# Patient Record
(demographics unavailable — no encounter records)

---

## 2025-03-03 NOTE — REASON FOR VISIT
[CV Risk Factors and Non-Cardiac Disease] : CV risk factors and non-cardiac disease [Hyperlipidemia] : hyperlipidemia [Hypertension] : hypertension [Source: ______] : History obtained from [unfilled] [Language [employer] ] : provided by [employer]   [FreeTextEntry1] : MA translated.

## 2025-03-03 NOTE — ASSESSMENT
[FreeTextEntry1] : 66 y/o female with history of HTN, DL, DM, CAD  Palpitations - resolved  Negative stress test and unremarkable echo - normal LV function, no evidence for ischemia Non-obstructive CAD  ASA 81 mg DM control. HgA1c - 7.0 - up from 6.3% - repeat HgA1c to re-assess glycemic control. diet discussed. F/u with PMD. Lipid control, c/w statin, labs noted - repeat lipid panel noted. Can decrease 1/2 pill  All questions answered. F/u with PMD as scheduled. F/u in 6 months

## 2025-04-10 NOTE — DISCUSSION/SUMMARY
[FreeTextEntry1] : Presenting with neck fatigue without headaches. Patient has concerns for stroke given her mother passed away from stroke as well as her risk actors to include DM, HLD and HTN.   Plan: -MRI head -MRA head and neck -Follow up after testing  All questions and concerns were addressed to the best of my ability. Emotional support was rendered.

## 2025-04-10 NOTE — HISTORY OF PRESENT ILLNESS
[FreeTextEntry1] : Ms. HATFIELD presents today for a consultation. She was scheduled as an NPA for headaches however presents for symptoms of neck fatigue. She is a 66 yo woman with a PMH of Dm, HTN, HLD and Cataracts. She reports symptoms of neck fatigue without pain or weakness. Symptoms improve with light exercise and worsen with physically over exerting herself. She further reports occasional blurry vision and lightheadedness.   She denies any headaches, vision changes, double vision, vertigo, lightheadedness, dizziness, numbness, tingling, muscle or joint aches or changes in balance.

## 2025-04-10 NOTE — PHYSICAL EXAM
[FreeTextEntry1] : General inspection: Well nourished, well developed in no acute distress with stable vital signs.  Neurological examination: MS: awake, alert with fluent speech.    Cranial nerve exam: PERRLA, EOMI, no nystagmus, visual fields are full, facial movements and sensations are normal, tongue movements are normal, and uvula and soft palate rise symmetrically at midline.   Motor exam: 5/5 throughout with normal tone and bulk. Fine motor skills are intact bilaterally   Coordination: No dysmetria on F-N or H-S.    Muscle stretch reflexes: 2/5 throughout. Plantar responses are flexor bilaterally.   Sensation: intact    Gait: normal. Romberg's sign is absent.     [Over the Past 2 Weeks, Have You Felt Down, Depressed, or Hopeless?] : 1.) Over the past 2 weeks, have you felt down, depressed, or hopeless? No [Over the Past 2 Weeks, Have You Felt Little Interest or Pleasure Doing Things?] : 2.) Over the past 2 weeks, have you felt little interest or pleasure doing things? No

## 2025-07-02 NOTE — ASSESSMENT
[FreeTextEntry1] : Ms. Caceres is a 67-year-old Japanese-speaking woman with PMH of DM, HTN, HLD, and Cataracts, with incidental 4 mm proximal L cavernous and 2 mm L supraclinoid ICA aneurysms seen on outpatient imaging during neck pain workup.  Given age, size, and multiple aneurysms, we discussed the utility of a DSA to further assess these lesions. After discussing the risks and benefits of DSA, patient expressed understanding and agreement with the plan, and asked us to schedule accordingly.   Plan - Will tentatively schedule DSA for early-mid July - PAST - NPO past midnight on day of procedure - Continue all medications day of procedure as prescribed - RTC in 1-2 weeks S/P DSA to discuss findings - Patient advised to call back or RTO if any concerning symptoms.

## 2025-07-02 NOTE — ASSESSMENT
[FreeTextEntry1] : Ms. Caceres is a 67-year-old Khmer-speaking woman with PMH of DM, HTN, HLD, and Cataracts, with incidental 4 mm proximal L cavernous and 2 mm L supraclinoid ICA aneurysms seen on outpatient imaging during neck pain workup.  Given age, size, and multiple aneurysms, we discussed the utility of a DSA to further assess these lesions. After discussing the risks and benefits of DSA, patient expressed understanding and agreement with the plan, and asked us to schedule accordingly.   Plan - Will tentatively schedule DSA for early-mid July - PAST - NPO past midnight on day of procedure - Continue all medications day of procedure as prescribed - RTC in 1-2 weeks S/P DSA to discuss findings - Patient advised to call back or RTO if any concerning symptoms.

## 2025-07-02 NOTE — REASON FOR VISIT
[Consultation] : a consultation visit [Interpreters_IDNumber] : [id number] [Interpreters_FullName] : Jerson [[email or web address]] : [place]

## 2025-07-02 NOTE — DATA REVIEWED
[de-identified] : 05/15/25 MR BRAIN: Mild chronic microvascular ischemic changes. Otherwise unremarkable MRI of the brain.  MRA HEAD: 4 mm aneurysm in the proximal cavernous segment and 2 mm aneurysm in the supraclinoid segment of the left internal carotid artery.

## 2025-07-02 NOTE — REASON FOR VISIT
[Consultation] : a consultation visit [Interpreters_IDNumber] : [id number] [Interpreters_FullName] : Jerson [[email or web address]] : Citizen of [place]

## 2025-07-02 NOTE — HISTORY OF PRESENT ILLNESS
[FreeTextEntry1] : Ms. Caceres is a 67-year-old Thai-speaking woman with PMH of DM, HTN, HLD, and Cataracts, who was referred by VEL Boothe, for evaluation of incidental 4 mm proximal L cavernous and 2 mm L supraclinoid ICA aneurysms seen on neck pain workup.   States she initially presented for evaluation as she was fatigued, forgetting things, and had pain in the front of her head. Endorsed dizziness and blurred vision at the time but attributed it to the sun. Denies any known family hx of aneurysms or sudden unexplained death. Reports mother had a stroke at 82, unsure of ischemic or hemorrhagic. Never smoker. No current complaints.

## 2025-07-02 NOTE — PHYSICAL EXAM
[FreeTextEntry1] : AAOx3, NAD CN II-XII normal No aphasia or dysarthria Strength 5/5 throughout Normal sensation Normal bulk, tone Narrow based gait

## 2025-07-02 NOTE — DATA REVIEWED
[de-identified] : 05/15/25 MR BRAIN: Mild chronic microvascular ischemic changes. Otherwise unremarkable MRI of the brain.  MRA HEAD: 4 mm aneurysm in the proximal cavernous segment and 2 mm aneurysm in the supraclinoid segment of the left internal carotid artery.

## 2025-07-24 NOTE — REASON FOR VISIT
[Follow-Up: _____] : a [unfilled] follow-up visit [Language Line ] : provided by [employer]   [Time Spent: ____ minutes] : Total time spent using  services: [unfilled] minutes. The patient's primary language is not English thus required  services. [Interpreters_IDNumber] : [id number] [Interpreters_FullName] : Ratna [[email or web address]] : [place]

## 2025-07-24 NOTE — ASSESSMENT
[FreeTextEntry1] : Ms. Caceres is a 68-year-old Kazakh-speaking woman with PMH of DM, HTN, HLD, and Cataracts, with incidental 4 mm proximal L cavernous and 2 mm L supraclinoid ICA aneurysms seen on outpatient imaging during neck pain workup. s/p DSA on 07/10/25 which confirmed a 4.0 mm x 2.6 mm inferomedially projecting L munira-cavernous ICA aneurysm, infundibuli of the L PComm and achoroidal artery, and a slightly bulbous appearance of the origin of the L ophthalmic artery.  Subjective R index finger coolness may be 2/2 trauma/nerve compression from catheterization. R extremity without any overt concerning symptoms. Advised to monitor for acute worsening and notify office if there is no improvement after 2 weeks. Given location of aneurysm and lack of concerning findings, advised that it is reasonable to monitor with interval surveillance imaging for now. We discussed should there be any interval changes of her aneurysm on follow-up imaging or she develops any new or concerning symptoms, we will re-discuss additional measures at that time.    Plan - Advised to monitor RRA site for acute worsening and notify office of any questions or concerns  - Will plan to obtain interval CTA H in 6 mos. to assess aneurysm stability - Cont. avoiding any activities that would increase intracranial pressure (i.e heavy weightlifting, overexertion, and bearing-down) - RTC in 6 mos s/p CTA H to discuss results

## 2025-07-24 NOTE — REASON FOR VISIT
[Follow-Up: _____] : a [unfilled] follow-up visit [Language Line ] : provided by [employer]   [Time Spent: ____ minutes] : Total time spent using  services: [unfilled] minutes. The patient's primary language is not English thus required  services. [Interpreters_IDNumber] : [id number] [Interpreters_FullName] : Ratna [[email or web address]] : Pakistani

## 2025-07-24 NOTE — DATA REVIEWED
[de-identified] : 07/10/25 DSA: 1) There is an inferomedially projecting left munira-cavernous ICA aneurysm, measuring approximately 4.0 mm x 2.6 mm in size, that is visualized.  2) There is brisk filling of the left ophthalmic artery with a slightly bulbous origin.  3) Infundibuli of the left PComm and Achoroidal artery are noted.

## 2025-07-24 NOTE — HISTORY OF PRESENT ILLNESS
[FreeTextEntry1] : Interval History:  Ms. Caceres is a 67-year-old Divehi-speaking woman who presents s/p elective DSA on 07/10/25 which confirmed a 4.0 mm x 2.6 mm inferomedially projecting L munira-cavernous ICA aneurysm, infundibuli of the L PComm and achoroidal artery, and a slightly bulbous appearance of the origin of the L ophthalmic artery.   Since procedure, endorses a cool sensation in her R index finger. Denies any numbness or tingling. Further endorses she is anxious over results of angiogram. Denies any further complaints.   Prior History:  HPI 06/2025: Ms. Caceres is a 67-year-old Divehi-speaking woman with PMH of DM, HTN, HLD, and Cataracts, who was referred by VEL Boothe, for evaluation of incidental 4 mm proximal L cavernous and 2 mm L supraclinoid ICA aneurysms seen on neck pain workup.   States she initially presented for evaluation as she was fatigued, forgetting things, and had pain in the front of her head. Endorsed dizziness and blurred vision at the time but attributed it to the sun. Denies any family hx of aneurysms or sudden unexplained death. Reports mother had a stroke at 82, unsure of ischemic or hemorrhagic. Never smoker.

## 2025-07-24 NOTE — PHYSICAL EXAM
[FreeTextEntry1] : AAOx3, NAD CN II-XII normal No aphasia or dysarthria Strength 5/5 throughout Normal sensation Normal bulk, tone Narrow based gait   RRA catheterization site dry, clean, and intact Radial pulse palpable, extremity warm, cap refill <3s

## 2025-07-24 NOTE — DATA REVIEWED
[de-identified] : 07/10/25 DSA: 1) There is an inferomedially projecting left munira-cavernous ICA aneurysm, measuring approximately 4.0 mm x 2.6 mm in size, that is visualized.  2) There is brisk filling of the left ophthalmic artery with a slightly bulbous origin.  3) Infundibuli of the left PComm and Achoroidal artery are noted.

## 2025-07-24 NOTE — ASSESSMENT
[FreeTextEntry1] : Ms. Caceres is a 68-year-old Romansh-speaking woman with PMH of DM, HTN, HLD, and Cataracts, with incidental 4 mm proximal L cavernous and 2 mm L supraclinoid ICA aneurysms seen on outpatient imaging during neck pain workup. s/p DSA on 07/10/25 which confirmed a 4.0 mm x 2.6 mm inferomedially projecting L munira-cavernous ICA aneurysm, infundibuli of the L PComm and achoroidal artery, and a slightly bulbous appearance of the origin of the L ophthalmic artery.  Subjective R index finger coolness may be 2/2 trauma/nerve compression from catheterization. R extremity without any overt concerning symptoms. Advised to monitor for acute worsening and notify office if there is no improvement after 2 weeks. Given location of aneurysm and lack of concerning findings, advised that it is reasonable to monitor with interval surveillance imaging for now. We discussed should there be any interval changes of her aneurysm on follow-up imaging or she develops any new or concerning symptoms, we will re-discuss additional measures at that time.    Plan - Advised to monitor RRA site for acute worsening and notify office of any questions or concerns  - Will plan to obtain interval CTA H in 6 mos. to assess aneurysm stability - Cont. avoiding any activities that would increase intracranial pressure (i.e heavy weightlifting, overexertion, and bearing-down) - RTC in 6 mos s/p CTA H to discuss results